# Patient Record
Sex: MALE | Race: WHITE | NOT HISPANIC OR LATINO | URBAN - NONMETROPOLITAN AREA
[De-identification: names, ages, dates, MRNs, and addresses within clinical notes are randomized per-mention and may not be internally consistent; named-entity substitution may affect disease eponyms.]

---

## 2020-08-04 ENCOUNTER — OFFICE VISIT (OUTPATIENT)
Dept: URBAN - NONMETROPOLITAN AREA CLINIC 13 | Facility: CLINIC | Age: 65
End: 2020-08-04
Payer: MEDICARE

## 2020-08-04 DIAGNOSIS — K76.0 FATTY LIVER: ICD-10-CM

## 2020-08-04 PROCEDURE — 99244 OFF/OP CNSLTJ NEW/EST MOD 40: CPT | Performed by: INTERNAL MEDICINE

## 2020-08-04 PROCEDURE — 99204 OFFICE O/P NEW MOD 45 MIN: CPT | Performed by: INTERNAL MEDICINE

## 2020-08-04 NOTE — HPI-TODAY'S VISIT:
Mr. paul is a very pleasant patient referred by Dr. Asha Grant in the emergency room for elevated bilirubin.  He developed kidney stones while on vacation at [place].  He saw urgent care but continued to have issues.  He then returned home and saw Dr. Grant in the emergency room.  Stone protocol CT scan showed fatty liver.  Labs are otherwise normal other than a bilirubin of 4.3.  He has never had elevated liver test in the past.  He has no significant history of alcohol or tobacco use.  He has no family history of liver disease.  He denies any pruritus, abdominal pain, jaundice, dark urine, or light stools.  He denies any weight loss.

## 2020-08-17 ENCOUNTER — TELEPHONE ENCOUNTER (OUTPATIENT)
Dept: URBAN - METROPOLITAN AREA CLINIC 92 | Facility: CLINIC | Age: 65
End: 2020-08-17

## 2020-08-17 ENCOUNTER — LAB OUTSIDE AN ENCOUNTER (OUTPATIENT)
Dept: URBAN - METROPOLITAN AREA CLINIC 92 | Facility: CLINIC | Age: 65
End: 2020-08-17

## 2020-08-18 ENCOUNTER — LAB OUTSIDE AN ENCOUNTER (OUTPATIENT)
Dept: URBAN - METROPOLITAN AREA CLINIC 92 | Facility: CLINIC | Age: 65
End: 2020-08-18

## 2020-08-27 ENCOUNTER — TELEPHONE ENCOUNTER (OUTPATIENT)
Dept: URBAN - METROPOLITAN AREA CLINIC 92 | Facility: CLINIC | Age: 65
End: 2020-08-27

## 2020-08-27 PROBLEM — 300332007: Status: ACTIVE | Noted: 2020-08-27

## 2020-09-01 ENCOUNTER — OFFICE VISIT (OUTPATIENT)
Dept: URBAN - NONMETROPOLITAN AREA CLINIC 13 | Facility: CLINIC | Age: 65
End: 2020-09-01

## 2020-09-08 ENCOUNTER — TELEPHONE ENCOUNTER (OUTPATIENT)
Dept: URBAN - METROPOLITAN AREA CLINIC 92 | Facility: CLINIC | Age: 65
End: 2020-09-08

## 2020-10-06 ENCOUNTER — OFFICE VISIT (OUTPATIENT)
Dept: URBAN - NONMETROPOLITAN AREA CLINIC 13 | Facility: CLINIC | Age: 65
End: 2020-10-06
Payer: MEDICARE

## 2020-10-06 ENCOUNTER — LAB OUTSIDE AN ENCOUNTER (OUTPATIENT)
Dept: URBAN - NONMETROPOLITAN AREA CLINIC 13 | Facility: CLINIC | Age: 65
End: 2020-10-06

## 2020-10-06 VITALS
WEIGHT: 177 LBS | HEART RATE: 76 BPM | BODY MASS INDEX: 23.98 KG/M2 | DIASTOLIC BLOOD PRESSURE: 73 MMHG | SYSTOLIC BLOOD PRESSURE: 123 MMHG | HEIGHT: 72 IN

## 2020-10-06 DIAGNOSIS — R93.2 ABNORMAL FINDING ON IMAGING OF LIVER: ICD-10-CM

## 2020-10-06 DIAGNOSIS — Z12.11 COLON CANCER SCREENING: ICD-10-CM

## 2020-10-06 DIAGNOSIS — R19.7 DIARRHEA, UNSPECIFIED TYPE: ICD-10-CM

## 2020-10-06 DIAGNOSIS — K74.60 CIRRHOSIS OF LIVER WITHOUT ASCITES, UNSPECIFIED HEPATIC CIRRHOSIS TYPE: ICD-10-CM

## 2020-10-06 DIAGNOSIS — K64.0 GRADE I HEMORRHOIDS: ICD-10-CM

## 2020-10-06 PROCEDURE — G8427 DOCREV CUR MEDS BY ELIG CLIN: HCPCS | Performed by: NURSE PRACTITIONER

## 2020-10-06 PROCEDURE — 99213 OFFICE O/P EST LOW 20 MIN: CPT | Performed by: NURSE PRACTITIONER

## 2020-10-06 PROCEDURE — G8420 CALC BMI NORM PARAMETERS: HCPCS | Performed by: NURSE PRACTITIONER

## 2020-10-06 PROCEDURE — 3017F COLORECTAL CA SCREEN DOC REV: CPT | Performed by: NURSE PRACTITIONER

## 2020-10-06 RX ORDER — COLESTIPOL HYDROCHLORIDE 1 G/1
1 TABLET 30MINS PRIOR TO A MEAL TABLET, FILM COATED ORAL BID
Qty: 60 | Refills: 6 | OUTPATIENT
Start: 2020-10-06

## 2020-10-06 RX ORDER — HYDROCORTISONE ACETATE AND PRAMOXINE HYDROCHLORIDE 25; 10 MG/G; MG/G
1 APPLICATION CREAM TOPICAL THREE TIMES A DAY
Qty: 1 TUBE | Refills: 3 | OUTPATIENT
Start: 2020-10-06 | End: 2020-11-30

## 2020-10-06 NOTE — PHYSICAL EXAM GASTROINTESTINAL
Abdomen , soft, right upper abdominal tenderness, nondistended , no guarding or rigidity , no masses palpable , normal bowel sounds , Liver and Spleen , no hepatosplenomegaly , liver nontender , spleen not palpable

## 2020-10-06 NOTE — HPI-TODAY'S VISIT:
Mr. Crum is here for colonoscopy. His last cololnoscopy was 2015 with TA polyp. He was last seen in August for abnormal imaging of his liver. 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 He went to [place] for a special radiology test that only hightlighted in his liver. . Since his diagnosis he has lost 25 pounds and having diarrhea 6-8 times a day with hemorrhoids. He had diarrhea related to CCY but this is different. He is taking 1-2 imodium with little improvement. He has been started on lanrotide. CS

## 2020-11-02 ENCOUNTER — OFFICE VISIT (OUTPATIENT)
Dept: URBAN - NONMETROPOLITAN AREA SURGERY CENTER 1 | Facility: SURGERY CENTER | Age: 65
End: 2020-11-02
Payer: MEDICARE

## 2020-11-02 ENCOUNTER — CLAIMS CREATED FROM THE CLAIM WINDOW (OUTPATIENT)
Dept: URBAN - METROPOLITAN AREA CLINIC 4 | Facility: CLINIC | Age: 65
End: 2020-11-02
Payer: MEDICARE

## 2020-11-02 DIAGNOSIS — R19.7 ACUTE DIARRHEA: ICD-10-CM

## 2020-11-02 DIAGNOSIS — K63.89 OTHER SPECIFIED DISEASES OF INTESTINE: ICD-10-CM

## 2020-11-02 PROCEDURE — 88305 TISSUE EXAM BY PATHOLOGIST: CPT | Performed by: PATHOLOGY

## 2020-11-02 PROCEDURE — 88342 IMHCHEM/IMCYTCHM 1ST ANTB: CPT | Performed by: PATHOLOGY

## 2020-11-02 PROCEDURE — 45380 COLONOSCOPY AND BIOPSY: CPT | Performed by: INTERNAL MEDICINE

## 2020-11-02 PROCEDURE — G9937 DIG OR SURV COLSCO: HCPCS | Performed by: INTERNAL MEDICINE

## 2020-11-02 PROCEDURE — G8907 PT DOC NO EVENTS ON DISCHARG: HCPCS | Performed by: INTERNAL MEDICINE

## 2020-11-02 PROCEDURE — 88313 SPECIAL STAINS GROUP 2: CPT | Performed by: PATHOLOGY

## 2020-11-04 ENCOUNTER — TELEPHONE ENCOUNTER (OUTPATIENT)
Dept: URBAN - NONMETROPOLITAN AREA CLINIC 13 | Facility: CLINIC | Age: 65
End: 2020-11-04

## 2020-11-25 ENCOUNTER — TELEPHONE ENCOUNTER (OUTPATIENT)
Dept: URBAN - METROPOLITAN AREA CLINIC 23 | Facility: CLINIC | Age: 65
End: 2020-11-25

## 2021-01-25 ENCOUNTER — TELEPHONE ENCOUNTER (OUTPATIENT)
Dept: URBAN - METROPOLITAN AREA CLINIC 92 | Facility: CLINIC | Age: 66
End: 2021-01-25

## 2021-01-25 RX ORDER — COLESTIPOL HYDROCHLORIDE 1 G/1
1 TABLET 30MINS PRIOR TO A MEAL TABLET, FILM COATED ORAL BID
Qty: 60 | Refills: 6 | Status: ACTIVE | COMMUNITY
Start: 2020-10-06

## 2021-01-25 RX ORDER — HYDROCORTISONE ACETATE AND PRAMOXINE HYDROCHLORIDE 25; 10 MG/G; MG/G
1 APPLICATION CREAM TOPICAL THREE TIMES A DAY
Qty: 1 TUBE | Refills: 3 | OUTPATIENT
Start: 2021-01-26 | End: 2021-03-23

## 2021-02-25 ENCOUNTER — OFFICE VISIT (OUTPATIENT)
Dept: URBAN - NONMETROPOLITAN AREA CLINIC 2 | Facility: CLINIC | Age: 66
End: 2021-02-25

## 2021-02-25 ENCOUNTER — OUT OF OFFICE VISIT (OUTPATIENT)
Dept: URBAN - METROPOLITAN AREA MEDICAL CENTER 12 | Facility: MEDICAL CENTER | Age: 66
End: 2021-02-25
Payer: MEDICARE

## 2021-02-25 DIAGNOSIS — K92.1 BLACK STOOL: ICD-10-CM

## 2021-02-25 DIAGNOSIS — R19.7 ACUTE DIARRHEA: ICD-10-CM

## 2021-02-25 DIAGNOSIS — R74.8 ABNORMAL ALKALINE PHOSPHATASE TEST: ICD-10-CM

## 2021-02-25 PROCEDURE — 99233 SBSQ HOSP IP/OBS HIGH 50: CPT | Performed by: INTERNAL MEDICINE

## 2021-02-25 PROCEDURE — G8427 DOCREV CUR MEDS BY ELIG CLIN: HCPCS | Performed by: INTERNAL MEDICINE

## 2021-02-25 PROCEDURE — 99222 1ST HOSP IP/OBS MODERATE 55: CPT | Performed by: INTERNAL MEDICINE

## 2021-02-27 ENCOUNTER — OUT OF OFFICE VISIT (OUTPATIENT)
Dept: URBAN - METROPOLITAN AREA MEDICAL CENTER 12 | Facility: MEDICAL CENTER | Age: 66
End: 2021-02-27
Payer: MEDICARE

## 2021-02-27 DIAGNOSIS — R18.8 ABDOMINAL FLUID COLLECTION: ICD-10-CM

## 2021-02-27 DIAGNOSIS — K62.5 ANAL BLEEDING: ICD-10-CM

## 2021-02-27 DIAGNOSIS — R19.7 ACUTE DIARRHEA: ICD-10-CM

## 2021-02-27 DIAGNOSIS — R74.8 ABNORMAL ALKALINE PHOSPHATASE TEST: ICD-10-CM

## 2021-02-27 PROCEDURE — 99233 SBSQ HOSP IP/OBS HIGH 50: CPT | Performed by: INTERNAL MEDICINE

## 2021-03-09 ENCOUNTER — OFFICE VISIT (OUTPATIENT)
Dept: URBAN - NONMETROPOLITAN AREA CLINIC 13 | Facility: CLINIC | Age: 66
End: 2021-03-09
Payer: MEDICARE

## 2021-03-09 DIAGNOSIS — R19.7 DIARRHEA, UNSPECIFIED TYPE: ICD-10-CM

## 2021-03-09 DIAGNOSIS — Z12.11 COLON CANCER SCREENING: ICD-10-CM

## 2021-03-09 DIAGNOSIS — R93.2 ABNORMAL FINDING ON IMAGING OF LIVER: ICD-10-CM

## 2021-03-09 DIAGNOSIS — K64.0 GRADE I HEMORRHOIDS: ICD-10-CM

## 2021-03-09 PROBLEM — 266468003 CIRRHOSIS - NON-ALCOHOLIC: Status: ACTIVE | Noted: 2021-03-09

## 2021-03-09 PROCEDURE — 99213 OFFICE O/P EST LOW 20 MIN: CPT | Performed by: NURSE PRACTITIONER

## 2021-03-09 RX ORDER — MORPHINE TINCTURE 1 G/100ML
(SCHEDULE II DRUG) SOLUTION ORAL
Qty: 108 MILLILITER | Status: ACTIVE | COMMUNITY

## 2021-03-09 RX ORDER — BUDESONIDE AND FORMOTEROL FUMARATE DIHYDRATE 160; 4.5 UG/1; UG/1
AEROSOL RESPIRATORY (INHALATION)
Qty: 10.2 G | Status: ACTIVE | COMMUNITY

## 2021-03-09 RX ORDER — PANCRELIPASE 24000; 76000; 120000 [USP'U]/1; [USP'U]/1; [USP'U]/1
CAPSULE, DELAYED RELEASE PELLETS ORAL
Qty: 90 CAP | Status: ACTIVE | COMMUNITY

## 2021-03-09 RX ORDER — LORAZEPAM 0.5 MG/1
(SCHEDULE IV DRUG) TABLET ORAL
Qty: 60 DELAYED RELEASE TABLET | Status: ACTIVE | COMMUNITY

## 2021-03-09 RX ORDER — ONDANSETRON 8 MG/1
TABLET, FILM COATED ORAL
Qty: 90 DELAYED RELEASE TABLET | Status: ACTIVE | COMMUNITY

## 2021-03-09 RX ORDER — LANREOTIDE ACETATE 120 MG/.5ML
INJECTION SUBCUTANEOUS
Qty: 0.5 MILLILITER | Status: ACTIVE | COMMUNITY

## 2021-03-09 RX ORDER — COLESTIPOL HYDROCHLORIDE 1 G/1
1 TABLET 30MINS PRIOR TO A MEAL TABLET, FILM COATED ORAL BID
Qty: 60 | Refills: 6 | OUTPATIENT

## 2021-03-09 RX ORDER — OXYCODONE HYDROCHLORIDE 5 MG/1
(SCHEDULE II DRUG) TABLET ORAL
Qty: 24 DELAYED RELEASE TABLET | Status: ACTIVE | COMMUNITY

## 2021-03-09 RX ORDER — COLESTIPOL HYDROCHLORIDE 1 G/1
1 TABLET 30MINS PRIOR TO A MEAL TABLET, FILM COATED ORAL BID
Qty: 60 | Refills: 6 | Status: ACTIVE | COMMUNITY
Start: 2020-10-06

## 2021-03-09 RX ORDER — PRAMOXINE HYDROCHLORIDE HYDROCORTISONE ACETATE 100; 100 MG/10G; MG/10G
AEROSOL, FOAM TOPICAL
Qty: 10 G | Status: ACTIVE | COMMUNITY

## 2021-03-09 RX ORDER — HYDROCORTISONE ACETATE AND PRAMOXINE HYDROCHLORIDE 25; 10 MG/G; MG/G
1 APPLICATION CREAM TOPICAL THREE TIMES A DAY
Qty: 1 TUBE | Refills: 3 | Status: ACTIVE | COMMUNITY
Start: 2021-01-26 | End: 2021-03-23

## 2021-03-09 RX ORDER — GRANISETRON 3.1 MG/24H
PATCH TRANSDERMAL
Qty: 4 PATCH | Status: ACTIVE | COMMUNITY

## 2021-03-09 RX ORDER — PROCHLORPERAZINE MALEATE 10 MG/1
TABLET ORAL
Qty: 30 DELAYED RELEASE TABLET | Status: ACTIVE | COMMUNITY

## 2021-03-09 RX ORDER — TELOTRISTAT ETHYL 250 MG/1
TABLET ORAL
Qty: 84 DELAYED RELEASE TABLET | Status: ACTIVE | COMMUNITY

## 2021-03-09 RX ORDER — TRAMADOL HYDROCHLORIDE 50 MG/1
(SCHEDULE IV DRUG) TABLET, FILM COATED ORAL
Qty: 30 DELAYED RELEASE TABLET | Status: ACTIVE | COMMUNITY

## 2021-03-09 RX ORDER — HYDROCORTISONE ACETATE AND PRAMOXINE HYDROCHLORIDE 25; 10 MG/G; MG/G
1 APPLICATION CREAM TOPICAL THREE TIMES A DAY
Qty: 1 TUBE | Refills: 3 | OUTPATIENT
Start: 2021-03-09 | End: 2021-05-04

## 2021-03-09 RX ORDER — NYSTATIN 100000 [USP'U]/ML
SUSPENSION ORAL
Qty: 100 MILLILITER | Status: ACTIVE | COMMUNITY

## 2021-03-09 RX ORDER — OLANZAPINE 5 MG/1
TABLET ORAL
Qty: 90 DELAYED RELEASE TABLET | Status: ACTIVE | COMMUNITY

## 2021-03-09 RX ORDER — HYDROCORTISONE ACETATE 25 MG/1
SUPPOSITORY RECTAL
Qty: 28 SUPP | Status: ACTIVE | COMMUNITY

## 2021-03-09 RX ORDER — DRONABINOL 2.5 MG/1
(SCHEDULE III DRUG) CAPSULE ORAL
Qty: 60 CAP | Status: ACTIVE | COMMUNITY

## 2021-03-09 RX ORDER — DENOSUMAB 120 MG/1.7ML
INJECTION SUBCUTANEOUS
Qty: 1.7 MILLILITER | Status: ACTIVE | COMMUNITY

## 2021-03-09 RX ORDER — AZITHROMYCIN MONOHYDRATE 250 MG/1
TABLET ORAL
Qty: 6 DELAYED RELEASE TABLET | Status: ACTIVE | COMMUNITY

## 2021-03-09 NOTE — HPI-TODAY'S VISIT:
3/9/2021 Mr. Crum is here for diarrhea and hemorrhoids. He had colonoscopy after his last OV due to hx of polyps and diarrhea.  His last cololnoscopy was normal with normal random bx. 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 He continues to have terrible diarrhea. He is taking colestipol, imodium, lomotil, and tinture of Opium. He continues to have diarrhea despite these meds. This is causing terrible hemorrhoids with rectal pain/bleeding. He was given suppositories but he is unable to get these in. He is in pain and feeling poorly. CS

## 2021-03-09 NOTE — HPI-OTHER HISTORIES
10/6/2020 Mr. Crum is here for colonoscopy. His last cololnoscopy was 2015 with TA polyp. He was last seen in August for abnormal imaging of his liver. 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 He went to [place] for a special radiology test that only hightlighted in his liver. . Since his diagnosis he has lost 25 pounds and having diarrhea 6-8 times a day with hemorrhoids. He had diarrhea related to CCY but this is different. He is taking 1-2 imodium with little improvement. He has been started on lanrotide. CS

## 2021-03-09 NOTE — PHYSICAL EXAM GASTROINTESTINAL
Abdomen , soft, nontender, nondistended , no guarding or rigidity , no masses palpable , normal bowel sounds , Liver and Spleen , no hepatosplenomegaly , liver nontender , spleen not palpable Rectal exam deferred

## 2021-03-16 ENCOUNTER — TELEPHONE ENCOUNTER (OUTPATIENT)
Dept: URBAN - METROPOLITAN AREA CLINIC 92 | Facility: CLINIC | Age: 66
End: 2021-03-16

## 2021-03-18 ENCOUNTER — TELEPHONE ENCOUNTER (OUTPATIENT)
Dept: URBAN - NONMETROPOLITAN AREA CLINIC 2 | Facility: CLINIC | Age: 66
End: 2021-03-18

## 2021-03-23 ENCOUNTER — OFFICE VISIT (OUTPATIENT)
Dept: URBAN - NONMETROPOLITAN AREA CLINIC 13 | Facility: CLINIC | Age: 66
End: 2021-03-23
Payer: MEDICARE

## 2021-03-23 ENCOUNTER — DASHBOARD ENCOUNTERS (OUTPATIENT)
Age: 66
End: 2021-03-23

## 2021-03-23 VITALS
TEMPERATURE: 97.7 F | BODY MASS INDEX: 19.07 KG/M2 | HEIGHT: 72 IN | SYSTOLIC BLOOD PRESSURE: 96 MMHG | HEART RATE: 64 BPM | WEIGHT: 140.8 LBS | DIASTOLIC BLOOD PRESSURE: 61 MMHG

## 2021-03-23 DIAGNOSIS — Z12.11 COLON CANCER SCREENING: ICD-10-CM

## 2021-03-23 DIAGNOSIS — K64.0 GRADE I HEMORRHOIDS: ICD-10-CM

## 2021-03-23 DIAGNOSIS — R93.2 ABNORMAL FINDING ON IMAGING OF LIVER: ICD-10-CM

## 2021-03-23 DIAGNOSIS — R19.7 DIARRHEA, UNSPECIFIED TYPE: ICD-10-CM

## 2021-03-23 PROCEDURE — 99213 OFFICE O/P EST LOW 20 MIN: CPT | Performed by: NURSE PRACTITIONER

## 2021-03-23 RX ORDER — BUDESONIDE AND FORMOTEROL FUMARATE DIHYDRATE 160; 4.5 UG/1; UG/1
AEROSOL RESPIRATORY (INHALATION)
Qty: 10.2 G | Status: ACTIVE | COMMUNITY

## 2021-03-23 RX ORDER — COLESTIPOL HYDROCHLORIDE 1 G/1
1 TABLET 30MINS PRIOR TO A MEAL TABLET, FILM COATED ORAL BID
Qty: 60 | Refills: 6 | Status: ACTIVE | COMMUNITY

## 2021-03-23 RX ORDER — TELOTRISTAT ETHYL 250 MG/1
TABLET ORAL
Qty: 84 DELAYED RELEASE TABLET | Status: ACTIVE | COMMUNITY

## 2021-03-23 RX ORDER — PANCRELIPASE 24000; 76000; 120000 [USP'U]/1; [USP'U]/1; [USP'U]/1
CAPSULE, DELAYED RELEASE PELLETS ORAL
Qty: 90 CAP | Status: ACTIVE | COMMUNITY

## 2021-03-23 RX ORDER — MORPHINE TINCTURE 1 G/100ML
(SCHEDULE II DRUG) SOLUTION ORAL
Qty: 108 MILLILITER | Status: ACTIVE | COMMUNITY

## 2021-03-23 RX ORDER — HYDROCORTISONE ACETATE AND PRAMOXINE HYDROCHLORIDE 25; 10 MG/G; MG/G
1 APPLICATION CREAM TOPICAL THREE TIMES A DAY
Qty: 1 TUBE | Refills: 3 | Status: ACTIVE | COMMUNITY
Start: 2021-01-26 | End: 2021-03-23

## 2021-03-23 RX ORDER — ONDANSETRON 8 MG/1
TABLET, FILM COATED ORAL
Qty: 90 DELAYED RELEASE TABLET | Status: ACTIVE | COMMUNITY

## 2021-03-23 RX ORDER — GRANISETRON 3.1 MG/24H
PATCH TRANSDERMAL
Qty: 4 PATCH | Status: ACTIVE | COMMUNITY

## 2021-03-23 RX ORDER — NYSTATIN 100000 [USP'U]/ML
SUSPENSION ORAL
Qty: 100 MILLILITER | Status: ACTIVE | COMMUNITY

## 2021-03-23 RX ORDER — TRAMADOL HYDROCHLORIDE 50 MG/1
(SCHEDULE IV DRUG) TABLET, FILM COATED ORAL
Qty: 30 DELAYED RELEASE TABLET | Status: ACTIVE | COMMUNITY

## 2021-03-23 RX ORDER — PRAMOXINE HYDROCHLORIDE HYDROCORTISONE ACETATE 100; 100 MG/10G; MG/10G
AEROSOL, FOAM TOPICAL
Qty: 10 G

## 2021-03-23 RX ORDER — OLANZAPINE 5 MG/1
TABLET ORAL
Qty: 90 DELAYED RELEASE TABLET | Status: ACTIVE | COMMUNITY

## 2021-03-23 RX ORDER — HYDROCORTISONE ACETATE AND PRAMOXINE HYDROCHLORIDE 25; 10 MG/G; MG/G
1 APPLICATION CREAM TOPICAL THREE TIMES A DAY
Qty: 1 TUBE | Refills: 3 | Status: ACTIVE | COMMUNITY
Start: 2021-03-09 | End: 2021-05-04

## 2021-03-23 RX ORDER — HYDROCORTISONE ACETATE 25 MG/1
SUPPOSITORY RECTAL
Qty: 28 SUPP | Status: ACTIVE | COMMUNITY

## 2021-03-23 RX ORDER — DRONABINOL 2.5 MG/1
(SCHEDULE III DRUG) CAPSULE ORAL
Qty: 60 CAP | Status: ACTIVE | COMMUNITY

## 2021-03-23 RX ORDER — DENOSUMAB 120 MG/1.7ML
INJECTION SUBCUTANEOUS
Qty: 1.7 MILLILITER | Status: ACTIVE | COMMUNITY

## 2021-03-23 RX ORDER — PRAMOXINE HYDROCHLORIDE HYDROCORTISONE ACETATE 100; 100 MG/10G; MG/10G
AEROSOL, FOAM TOPICAL
Qty: 10 G | Status: ACTIVE | COMMUNITY

## 2021-03-23 RX ORDER — LANREOTIDE ACETATE 120 MG/.5ML
INJECTION SUBCUTANEOUS
Qty: 0.5 MILLILITER | Status: ACTIVE | COMMUNITY

## 2021-03-23 RX ORDER — LORAZEPAM 0.5 MG/1
(SCHEDULE IV DRUG) TABLET ORAL
Qty: 60 DELAYED RELEASE TABLET | Status: ACTIVE | COMMUNITY

## 2021-03-23 RX ORDER — AZITHROMYCIN MONOHYDRATE 250 MG/1
TABLET ORAL
Qty: 6 DELAYED RELEASE TABLET | Status: ACTIVE | COMMUNITY

## 2021-03-23 RX ORDER — OXYCODONE HYDROCHLORIDE 5 MG/1
(SCHEDULE II DRUG) TABLET ORAL
Qty: 24 DELAYED RELEASE TABLET | Status: ACTIVE | COMMUNITY

## 2021-03-23 RX ORDER — PROCHLORPERAZINE MALEATE 10 MG/1
TABLET ORAL
Qty: 30 DELAYED RELEASE TABLET | Status: ACTIVE | COMMUNITY

## 2021-03-23 NOTE — HPI-TODAY'S VISIT:
3/23/2021 Mr. Crum is here for f/u of diarrhea and hemorrhoids. He has neuroendocrine tumor metastatic to the liverand bones with small bowel possibly being the primary. He is taking colestipol, imodium, lomotil, creon, banatrol, and tinture of Opium. He continued to have diarrhea despite these meds until yesterday  He had been on banatrol for about three days. The diarrhea has caused terrible hemorrhoids with rectal pain/bleeding. He was given suppositories but he is unable to get these in. He is able to use proctifoam and analpram but these do not seem to really help. He is in pain and feels poorly. CS

## 2021-03-23 NOTE — HPI-OTHER HISTORIES
10/6/2020 Mr. Crum is here for colonoscopy. His last cololnoscopy was 2015 with TA polyp. He was last seen in August for abnormal imaging of his liver. 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 He went to [place] for a special radiology test that only hightlighted in his liver. . Since his diagnosis he has lost 25 pounds and having diarrhea 6-8 times a day with hemorrhoids. He had diarrhea related to CCY but this is different. He is taking 1-2 imodium with little improvement. He has been started on lanrotide. CS   3/9/2021 Mr. Crum is here for diarrhea and hemorrhoids. He had colonoscopy after his last OV due to hx of polyps and diarrhea.  His last cololnoscopy was normal with normal random bx. 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 He continues to have terrible diarrhea. He is taking colestipol, imodium, lomotil, and tinture of Opium. He continues to have diarrhea despite these meds. This is causing terrible hemorrhoids with rectal pain/bleeding. He was given suppositories but he is unable to get these in. He is in pain and feeling poorly. CS

## 2021-03-24 ENCOUNTER — TELEPHONE ENCOUNTER (OUTPATIENT)
Dept: URBAN - METROPOLITAN AREA CLINIC 92 | Facility: CLINIC | Age: 66
End: 2021-03-24

## 2021-03-24 RX ORDER — PRAMOXINE HYDROCHLORIDE HYDROCORTISONE ACETATE 100; 100 MG/10G; MG/10G
1 APPLICATION AEROSOL, FOAM TOPICAL THREE TIMES A DAY
Qty: 1 BOTTLE | Refills: 3 | OUTPATIENT
Start: 2021-03-24 | End: 2021-05-19

## 2021-03-24 RX ORDER — LORAZEPAM 0.5 MG/1
(SCHEDULE IV DRUG) TABLET ORAL
Qty: 60 DELAYED RELEASE TABLET | Status: ACTIVE | COMMUNITY

## 2021-03-24 RX ORDER — PRAMOXINE HYDROCHLORIDE HYDROCORTISONE ACETATE 100; 100 MG/10G; MG/10G
AEROSOL, FOAM TOPICAL
Qty: 10 G | Status: ACTIVE | COMMUNITY

## 2021-03-24 RX ORDER — PRAMOXINE HYDROCHLORIDE HYDROCORTISONE ACETATE 100; 100 MG/10G; MG/10G
1 APPLICATION AEROSOL, FOAM TOPICAL THREE TIMES A DAY
OUTPATIENT
Start: 2021-03-24

## 2021-03-24 RX ORDER — HYDROCORTISONE ACETATE 25 MG/1
SUPPOSITORY RECTAL
Qty: 28 SUPP | Status: ACTIVE | COMMUNITY

## 2021-03-24 RX ORDER — BUDESONIDE AND FORMOTEROL FUMARATE DIHYDRATE 160; 4.5 UG/1; UG/1
AEROSOL RESPIRATORY (INHALATION)
Qty: 10.2 G | Status: ACTIVE | COMMUNITY

## 2021-03-24 RX ORDER — HYDROCORTISONE ACETATE AND PRAMOXINE HYDROCHLORIDE 25; 10 MG/G; MG/G
1 APPLICATION CREAM TOPICAL THREE TIMES A DAY
Qty: 1 TUBE | Refills: 3 | Status: ACTIVE | COMMUNITY
Start: 2021-03-09 | End: 2021-05-04

## 2021-03-24 RX ORDER — ONDANSETRON 8 MG/1
TABLET, FILM COATED ORAL
Qty: 90 DELAYED RELEASE TABLET | Status: ACTIVE | COMMUNITY

## 2021-03-24 RX ORDER — NYSTATIN 100000 [USP'U]/ML
SUSPENSION ORAL
Qty: 100 MILLILITER | Status: ACTIVE | COMMUNITY

## 2021-03-24 RX ORDER — TELOTRISTAT ETHYL 250 MG/1
TABLET ORAL
Qty: 84 DELAYED RELEASE TABLET | Status: ACTIVE | COMMUNITY

## 2021-03-24 RX ORDER — OXYCODONE HYDROCHLORIDE 5 MG/1
(SCHEDULE II DRUG) TABLET ORAL
Qty: 24 DELAYED RELEASE TABLET | Status: ACTIVE | COMMUNITY

## 2021-03-24 RX ORDER — MORPHINE TINCTURE 1 G/100ML
(SCHEDULE II DRUG) SOLUTION ORAL
Qty: 108 MILLILITER | Status: ACTIVE | COMMUNITY

## 2021-03-24 RX ORDER — AZITHROMYCIN MONOHYDRATE 250 MG/1
TABLET ORAL
Qty: 6 DELAYED RELEASE TABLET | Status: ACTIVE | COMMUNITY

## 2021-03-24 RX ORDER — OLANZAPINE 5 MG/1
TABLET ORAL
Qty: 90 DELAYED RELEASE TABLET | Status: ACTIVE | COMMUNITY

## 2021-03-24 RX ORDER — TRAMADOL HYDROCHLORIDE 50 MG/1
(SCHEDULE IV DRUG) TABLET, FILM COATED ORAL
Qty: 30 DELAYED RELEASE TABLET | Status: ACTIVE | COMMUNITY

## 2021-03-24 RX ORDER — DENOSUMAB 120 MG/1.7ML
INJECTION SUBCUTANEOUS
Qty: 1.7 MILLILITER | Status: ACTIVE | COMMUNITY

## 2021-03-24 RX ORDER — COLESTIPOL HYDROCHLORIDE 1 G/1
1 TABLET 30MINS PRIOR TO A MEAL TABLET, FILM COATED ORAL BID
Qty: 60 | Refills: 6 | Status: ACTIVE | COMMUNITY

## 2021-03-24 RX ORDER — LANREOTIDE ACETATE 120 MG/.5ML
INJECTION SUBCUTANEOUS
Qty: 0.5 MILLILITER | Status: ACTIVE | COMMUNITY

## 2021-03-24 RX ORDER — PANCRELIPASE 24000; 76000; 120000 [USP'U]/1; [USP'U]/1; [USP'U]/1
CAPSULE, DELAYED RELEASE PELLETS ORAL
Qty: 90 CAP | Status: ACTIVE | COMMUNITY

## 2021-03-24 RX ORDER — DRONABINOL 2.5 MG/1
(SCHEDULE III DRUG) CAPSULE ORAL
Qty: 60 CAP | Status: ACTIVE | COMMUNITY

## 2021-03-24 RX ORDER — PROCHLORPERAZINE MALEATE 10 MG/1
TABLET ORAL
Qty: 30 DELAYED RELEASE TABLET | Status: ACTIVE | COMMUNITY

## 2021-03-24 RX ORDER — GRANISETRON 3.1 MG/24H
PATCH TRANSDERMAL
Qty: 4 PATCH | Status: ACTIVE | COMMUNITY